# Patient Record
Sex: MALE | Race: WHITE | NOT HISPANIC OR LATINO | Employment: OTHER | URBAN - METROPOLITAN AREA
[De-identification: names, ages, dates, MRNs, and addresses within clinical notes are randomized per-mention and may not be internally consistent; named-entity substitution may affect disease eponyms.]

---

## 2018-05-25 ENCOUNTER — HOSPITAL ENCOUNTER (EMERGENCY)
Facility: HOSPITAL | Age: 78
Discharge: ANOTHER HOSPITAL | End: 2018-05-25
Attending: SURGERY
Payer: MEDICARE

## 2018-05-25 VITALS
TEMPERATURE: 97 F | DIASTOLIC BLOOD PRESSURE: 137 MMHG | RESPIRATION RATE: 23 BRPM | HEART RATE: 107 BPM | WEIGHT: 240 LBS | BODY MASS INDEX: 38.57 KG/M2 | OXYGEN SATURATION: 91 % | HEIGHT: 66 IN | SYSTOLIC BLOOD PRESSURE: 189 MMHG

## 2018-05-25 DIAGNOSIS — R07.9 CHEST PAIN: ICD-10-CM

## 2018-05-25 DIAGNOSIS — I21.3 ST ELEVATION MYOCARDIAL INFARCTION (STEMI), UNSPECIFIED ARTERY: Primary | ICD-10-CM

## 2018-05-25 PROBLEM — I21.02 STEMI INVOLVING LEFT ANTERIOR DESCENDING CORONARY ARTERY: Status: ACTIVE | Noted: 2018-05-25

## 2018-05-25 PROBLEM — I25.10 CORONARY ARTERY DISEASE INVOLVING NATIVE CORONARY ARTERY: Status: ACTIVE | Noted: 2018-05-25

## 2018-05-25 LAB
ALBUMIN SERPL BCP-MCNC: 3.7 G/DL
ALP SERPL-CCNC: 100 U/L
ALT SERPL W/O P-5'-P-CCNC: 16 U/L
ANION GAP SERPL CALC-SCNC: 13 MMOL/L
APTT BLDCRRT: 21.4 SEC
AST SERPL-CCNC: 17 U/L
BASOPHILS # BLD AUTO: 0.03 K/UL
BASOPHILS NFR BLD: 0.2 %
BILIRUB SERPL-MCNC: 0.7 MG/DL
BNP SERPL-MCNC: 121 PG/ML
BUN SERPL-MCNC: 17 MG/DL
CALCIUM SERPL-MCNC: 9.3 MG/DL
CHLORIDE SERPL-SCNC: 104 MMOL/L
CK MB SERPL-MCNC: 1 NG/ML
CK MB SERPL-RTO: 1.5 %
CK SERPL-CCNC: 68 U/L
CK SERPL-CCNC: 68 U/L
CO2 SERPL-SCNC: 23 MMOL/L
CREAT SERPL-MCNC: 1 MG/DL
D DIMER PPP IA.FEU-MCNC: 0.94 MG/L FEU
DIFFERENTIAL METHOD: ABNORMAL
EOSINOPHIL # BLD AUTO: 0.4 K/UL
EOSINOPHIL NFR BLD: 3 %
ERYTHROCYTE [DISTWIDTH] IN BLOOD BY AUTOMATED COUNT: 13.4 %
EST. GFR  (AFRICAN AMERICAN): >60 ML/MIN/1.73 M^2
EST. GFR  (NON AFRICAN AMERICAN): >60 ML/MIN/1.73 M^2
GLUCOSE SERPL-MCNC: 161 MG/DL
HCT VFR BLD AUTO: 51.9 %
HGB BLD-MCNC: 17.3 G/DL
INR PPP: 1.1
LYMPHOCYTES # BLD AUTO: 5.1 K/UL
LYMPHOCYTES NFR BLD: 42.1 %
MCH RBC QN AUTO: 29.5 PG
MCHC RBC AUTO-ENTMCNC: 33.3 G/DL
MCV RBC AUTO: 89 FL
MONOCYTES # BLD AUTO: 1.2 K/UL
MONOCYTES NFR BLD: 9.7 %
NEUTROPHILS # BLD AUTO: 5.5 K/UL
NEUTROPHILS NFR BLD: 45 %
PLATELET # BLD AUTO: 260 K/UL
PMV BLD AUTO: 10.7 FL
POTASSIUM SERPL-SCNC: 3.8 MMOL/L
PROT SERPL-MCNC: 7.8 G/DL
PROTHROMBIN TIME: 10.8 SEC
RBC # BLD AUTO: 5.86 M/UL
SODIUM SERPL-SCNC: 140 MMOL/L
TROPONIN I SERPL DL<=0.01 NG/ML-MCNC: 0.01 NG/ML
WBC # BLD AUTO: 12.16 K/UL

## 2018-05-25 PROCEDURE — 84484 ASSAY OF TROPONIN QUANT: CPT

## 2018-05-25 PROCEDURE — 80053 COMPREHEN METABOLIC PANEL: CPT

## 2018-05-25 PROCEDURE — 99291 CRITICAL CARE FIRST HOUR: CPT | Mod: 25

## 2018-05-25 PROCEDURE — 96365 THER/PROPH/DIAG IV INF INIT: CPT

## 2018-05-25 PROCEDURE — 63600175 PHARM REV CODE 636 W HCPCS

## 2018-05-25 PROCEDURE — 96374 THER/PROPH/DIAG INJ IV PUSH: CPT | Mod: 59

## 2018-05-25 PROCEDURE — 85379 FIBRIN DEGRADATION QUANT: CPT

## 2018-05-25 PROCEDURE — 96375 TX/PRO/DX INJ NEW DRUG ADDON: CPT | Mod: 59

## 2018-05-25 PROCEDURE — 85610 PROTHROMBIN TIME: CPT

## 2018-05-25 PROCEDURE — 93005 ELECTROCARDIOGRAM TRACING: CPT

## 2018-05-25 PROCEDURE — 85025 COMPLETE CBC W/AUTO DIFF WBC: CPT

## 2018-05-25 PROCEDURE — 85730 THROMBOPLASTIN TIME PARTIAL: CPT

## 2018-05-25 PROCEDURE — 83880 ASSAY OF NATRIURETIC PEPTIDE: CPT

## 2018-05-25 PROCEDURE — 82550 ASSAY OF CK (CPK): CPT

## 2018-05-25 PROCEDURE — 63600175 PHARM REV CODE 636 W HCPCS: Performed by: SURGERY

## 2018-05-25 PROCEDURE — 96368 THER/DIAG CONCURRENT INF: CPT

## 2018-05-25 PROCEDURE — 82553 CREATINE MB FRACTION: CPT

## 2018-05-25 PROCEDURE — 36415 COLL VENOUS BLD VENIPUNCTURE: CPT

## 2018-05-25 PROCEDURE — 25000003 PHARM REV CODE 250: Performed by: SURGERY

## 2018-05-25 RX ORDER — METOPROLOL TARTRATE 1 MG/ML
5 INJECTION, SOLUTION INTRAVENOUS
Status: COMPLETED | OUTPATIENT
Start: 2018-05-25 | End: 2018-05-25

## 2018-05-25 RX ORDER — NITROGLYCERIN 20 MG/100ML
INJECTION INTRAVENOUS
Status: DISCONTINUED
Start: 2018-05-25 | End: 2018-05-25 | Stop reason: HOSPADM

## 2018-05-25 RX ORDER — ONDANSETRON 2 MG/ML
4 INJECTION INTRAMUSCULAR; INTRAVENOUS
Status: COMPLETED | OUTPATIENT
Start: 2018-05-25 | End: 2018-05-25

## 2018-05-25 RX ORDER — NITROGLYCERIN 20 MG/100ML
10 INJECTION INTRAVENOUS CONTINUOUS
Status: DISCONTINUED | OUTPATIENT
Start: 2018-05-25 | End: 2018-05-25 | Stop reason: HOSPADM

## 2018-05-25 RX ORDER — METOPROLOL SUCCINATE 50 MG/1
50 TABLET, EXTENDED RELEASE ORAL DAILY
COMMUNITY
End: 2020-10-08 | Stop reason: DRUGHIGH

## 2018-05-25 RX ORDER — HEPARIN SODIUM 10000 [USP'U]/100ML
INJECTION, SOLUTION INTRAVENOUS
Status: COMPLETED
Start: 2018-05-25 | End: 2018-05-25

## 2018-05-25 RX ORDER — HEPARIN SODIUM,PORCINE/D5W 25000/250
17 INTRAVENOUS SOLUTION INTRAVENOUS CONTINUOUS
Status: DISCONTINUED | OUTPATIENT
Start: 2018-05-25 | End: 2018-05-25 | Stop reason: HOSPADM

## 2018-05-25 RX ORDER — LISINOPRIL 40 MG/1
40 TABLET ORAL 2 TIMES DAILY
COMMUNITY
End: 2020-10-08

## 2018-05-25 RX ORDER — FUROSEMIDE 10 MG/ML
INJECTION INTRAMUSCULAR; INTRAVENOUS
Status: DISCONTINUED
Start: 2018-05-25 | End: 2018-05-25 | Stop reason: HOSPADM

## 2018-05-25 RX ORDER — CLONIDINE HYDROCHLORIDE 0.2 MG/1
0.2 TABLET ORAL 2 TIMES DAILY
COMMUNITY
End: 2020-10-08

## 2018-05-25 RX ORDER — ASPIRIN 81 MG/1
81 TABLET ORAL DAILY
COMMUNITY
End: 2020-10-08

## 2018-05-25 RX ORDER — MORPHINE SULFATE 4 MG/ML
2 INJECTION, SOLUTION INTRAMUSCULAR; INTRAVENOUS
Status: COMPLETED | OUTPATIENT
Start: 2018-05-25 | End: 2018-05-25

## 2018-05-25 RX ORDER — FUROSEMIDE 10 MG/ML
40 INJECTION INTRAMUSCULAR; INTRAVENOUS
Status: COMPLETED | OUTPATIENT
Start: 2018-05-25 | End: 2018-05-25

## 2018-05-25 RX ORDER — NAPROXEN SODIUM 220 MG/1
81 TABLET, FILM COATED ORAL
Status: COMPLETED | OUTPATIENT
Start: 2018-05-25 | End: 2018-05-25

## 2018-05-25 RX ADMIN — FUROSEMIDE 40 MG: 10 INJECTION, SOLUTION INTRAMUSCULAR; INTRAVENOUS at 02:05

## 2018-05-25 RX ADMIN — NITROGLYCERIN 10 MCG/MIN: 20 INJECTION INTRAVENOUS at 02:05

## 2018-05-25 RX ADMIN — MORPHINE SULFATE 2 MG: 4 INJECTION INTRAVENOUS at 02:05

## 2018-05-25 RX ADMIN — METOPROLOL TARTRATE 5 MG: 5 INJECTION, SOLUTION INTRAVENOUS at 02:05

## 2018-05-25 RX ADMIN — Medication 17 UNITS/KG/HR: at 02:05

## 2018-05-25 RX ADMIN — ONDANSETRON 4 MG: 2 SOLUTION INTRAMUSCULAR; INTRAVENOUS at 02:05

## 2018-05-25 RX ADMIN — HEPARIN SODIUM AND DEXTROSE 17 UNITS/KG/HR: 10000; 5 INJECTION INTRAVENOUS at 02:05

## 2018-05-25 RX ADMIN — ASPIRIN 81 MG 81 MG: 81 TABLET ORAL at 02:05

## 2018-05-25 NOTE — ED PROVIDER NOTES
"[facility]                                                 Chief Complaint  77 y.o. male with Chest Pain (for one hour with SOB, constant "pressure")    History of Present Illness  Alonso Ortiz presents to the emergency room with chest pain this evening  Pt's chest pain started approximately 1 hour ago, obvious distress on arrival  EKG showed an ST elevation MI most leads, patient sees [facility] ER immediately called for stat transfer, pt had been here 25 min  Patient has hypertensive, diaphoretic, heparin and nitroglycerin and initiated    The history is provided by the patient   device was not used during this ER visit  Medical history: Coronary artery disease with an LAD stent  History reviewed. No pertinent surgical history.   No Known Allergies   No family history on file.    Review of Systems and Physical Exam      Review of Systems - provided by the patient  -- Constitution - no fever, denies fatigue, no weakness, no chills  -- Eyes - no tearing or redness, no visual disturbance  -- Ear, Nose - no tinnitus or earache, no nasal congestion or discharge  -- Mouth,Throat - no sore throat, no toothache, normal voice, normal swallowing  -- Respiratory - shortness of breath, no cough or congestion of breath, no HARRIS  -- Cardiovascular - chest pain, no palpitations, denies claudication  -- Gastrointestinal - denies abdominal pain, nausea, vomiting, or diarrhea  -- Genitourinary - no dysuria, no denies flank pain, no hematuria or frequency   -- Musculoskeletal - denies back pain, negative for myalgias and arthralgias   -- Neurological - no headache, denies weakness or seizure; no LOC  -- Skin - denies pallor, rash, or changes in skin. no hives or welts noted    BP (!) 189/135   Pulse 104   Temp 97.3 °F  Resp (!) 26  SpO2 (!) 82%    Physical Exam  -- Nursing note and vitals reviewed  -- Constitutional: Diaphoretic older white male  -- Head: Atraumatic. " Normocephalic. No obvious abnormality  -- Eyes: Pupils are equal and reactive to light. Normal conjunctiva and lids  -- Cardiac: Normal rate, regular rhythm and normal heart sounds  -- Pulmonary: Normal respiratory effort, breath sounds clear to auscultation  -- Abdominal: Soft, no tenderness. Normal bowel sounds. Normal liver edge  -- Musculoskeletal: Normal range of motion, no effusions. Joints stable   -- Neurological: No focal deficits. Showed good interaction with staff  -- Vascular: Posterior tibial, dorsalis pedis and radial pulses 2+ bilaterally      Emergency Room Course      Treatment and Evaluation  -- The CBC drawn in the ER today was within normal limits  -- The rest of the ordered lab work is pending at the time of transfer    EKG  -- EKG shows ST elevation MI    Radiology  -- Chest x-ray shows pulmonary edema    Medications Given  -- nitroGLYCERIN 50 mg in dextrose 5 % 250 mL infusion (15 mcg/min Intravenous Rate/Dose Change 5/25/18 0217)   -- heparin 25,000 units in dextrose 5% 250 mL (100 units/mL) bolus from bag; PRN BOLUS (not administered)   -- heparin 25,000 units in dextrose 5% 250 mL (100 units/mL) bolus from bag; PRN BOLUS (not administered)   -- heparin 25,000 units in dextrose 5% 250 mL (100 units/mL) infusion; MALE   -- furosemide injection 40 mg (not administered)   -- furosemide (LASIX) 10 mg/mL injection (not administered)   -- aspirin chewable tablet 81 mg (81 mg Oral Given 5/25/18 0211)   -- metoprolol injection 5 mg (5 mg Intravenous Given 5/25/18 0207)   -- heparin 25,000 units in dextrose 5% 250 mL (100 units/mL) bolus from bag; INITIAL BOLUS DOSE   -- morphine injection 2 mg (2 mg Intravenous Given 5/25/18 0213)   -- ondansetron injection 4 mg (4 mg Intravenous Given 5/25/18 0205)     Medical Decision Making  -- Diagnosis management comments: 77 y.o. male with a STEMI this evening  -- Nitroglycerin and heparin drip immediately started with an aspirin and morphine  -- [facility] immediately called, accepted to go straight to the cath lab    Critical Care ED Physician Time (minutes):  -- Performed by: Servando Earl M.D.  -- Date/Time: 2:28 AM 5/25/2018   -- Direct Patient Care (Face Time): 10  -- Additional History from Records or Taking Additional History: 10  -- Ordering, Reviewing, and Interpreting Diagnostic Studies: 10  -- Total Time in Documentation: 0  -- Consultation with Other Physicians: 10  -- Consultation with Family Related to Condition: 0  -- Total Critical Care Time: 40    Diagnosis  -- ST elevation myocardial infarction (STEMI)  -- Chest pain     Disposition and Plan  -- Disposition: transfer  -- Condition: stable  -- The patient needs a higher level of care  -- The patient is appropriate for transfer    This note is dictated on Dragon Natural Speaking word recognition program.  There are word recognition mistakes that are occasionally missed on review.              Servando Earl MD  05/25/18 0229

## 2018-05-25 NOTE — ED NOTES
Patient transferred out on stretcher with [facility] with nitroglycerin gtt @ 15 mcg/min & Heparin gtt @ 17 units/kg/hour per orders.  Patient placed on CPAP by EMT-P per Dr. Earl's orders.  Report given to [facility] supervisor and EMT-P.

## 2018-05-28 PROBLEM — Z78.9 KNOWLEDGE DEFICIT ABOUT THERAPEUTIC DIET: Status: ACTIVE | Noted: 2018-05-28

## 2018-06-04 PROBLEM — Z78.9 KNOWLEDGE DEFICIT ABOUT THERAPEUTIC DIET: Status: ACTIVE | Noted: 2018-06-04

## 2018-06-06 ENCOUNTER — HOSPITAL ENCOUNTER (EMERGENCY)
Facility: HOSPITAL | Age: 78
Discharge: ANOTHER HOSPITAL | End: 2018-06-07
Attending: SURGERY
Payer: MEDICARE

## 2018-06-06 DIAGNOSIS — I50.9 CONGESTIVE HEART FAILURE, UNSPECIFIED HF CHRONICITY, UNSPECIFIED HEART FAILURE TYPE: Primary | ICD-10-CM

## 2018-06-06 DIAGNOSIS — R06.02 SOB (SHORTNESS OF BREATH): ICD-10-CM

## 2018-06-06 LAB
ALBUMIN SERPL BCP-MCNC: 3.1 G/DL
ALP SERPL-CCNC: 92 U/L
ALT SERPL W/O P-5'-P-CCNC: 26 U/L
ANION GAP SERPL CALC-SCNC: 8 MMOL/L
APTT BLDCRRT: 25.6 SEC
AST SERPL-CCNC: 26 U/L
BASOPHILS # BLD AUTO: 0.05 K/UL
BASOPHILS NFR BLD: 0.5 %
BILIRUB SERPL-MCNC: 0.5 MG/DL
BNP SERPL-MCNC: 2418 PG/ML
BUN SERPL-MCNC: 12 MG/DL
CALCIUM SERPL-MCNC: 9.5 MG/DL
CHLORIDE SERPL-SCNC: 104 MMOL/L
CK MB SERPL-MCNC: 2.1 NG/ML
CK MB SERPL-RTO: 3.2 %
CK SERPL-CCNC: 66 U/L
CK SERPL-CCNC: 66 U/L
CO2 SERPL-SCNC: 26 MMOL/L
CREAT SERPL-MCNC: 0.9 MG/DL
DIFFERENTIAL METHOD: ABNORMAL
EOSINOPHIL # BLD AUTO: 0.2 K/UL
EOSINOPHIL NFR BLD: 1.9 %
ERYTHROCYTE [DISTWIDTH] IN BLOOD BY AUTOMATED COUNT: 13.1 %
EST. GFR  (AFRICAN AMERICAN): >60 ML/MIN/1.73 M^2
EST. GFR  (NON AFRICAN AMERICAN): >60 ML/MIN/1.73 M^2
GLUCOSE SERPL-MCNC: 114 MG/DL
HCT VFR BLD AUTO: 44.7 %
HGB BLD-MCNC: 15 G/DL
INR PPP: 1.2
LYMPHOCYTES # BLD AUTO: 2.3 K/UL
LYMPHOCYTES NFR BLD: 21.6 %
MCH RBC QN AUTO: 29.8 PG
MCHC RBC AUTO-ENTMCNC: 33.6 G/DL
MCV RBC AUTO: 89 FL
MONOCYTES # BLD AUTO: 1.1 K/UL
MONOCYTES NFR BLD: 10.2 %
NEUTROPHILS # BLD AUTO: 6.9 K/UL
NEUTROPHILS NFR BLD: 65.8 %
PLATELET # BLD AUTO: 328 K/UL
PMV BLD AUTO: 11.3 FL
POTASSIUM SERPL-SCNC: 4.2 MMOL/L
PROT SERPL-MCNC: 7.4 G/DL
PROTHROMBIN TIME: 11.9 SEC
RBC # BLD AUTO: 5.04 M/UL
SODIUM SERPL-SCNC: 138 MMOL/L
TROPONIN I SERPL DL<=0.01 NG/ML-MCNC: 0.9 NG/ML
WBC # BLD AUTO: 10.41 K/UL

## 2018-06-06 PROCEDURE — 94640 AIRWAY INHALATION TREATMENT: CPT

## 2018-06-06 PROCEDURE — 36415 COLL VENOUS BLD VENIPUNCTURE: CPT

## 2018-06-06 PROCEDURE — 84484 ASSAY OF TROPONIN QUANT: CPT

## 2018-06-06 PROCEDURE — 80053 COMPREHEN METABOLIC PANEL: CPT

## 2018-06-06 PROCEDURE — 82553 CREATINE MB FRACTION: CPT

## 2018-06-06 PROCEDURE — 83880 ASSAY OF NATRIURETIC PEPTIDE: CPT

## 2018-06-06 PROCEDURE — 63600175 PHARM REV CODE 636 W HCPCS: Performed by: SURGERY

## 2018-06-06 PROCEDURE — 93005 ELECTROCARDIOGRAM TRACING: CPT

## 2018-06-06 PROCEDURE — 25000242 PHARM REV CODE 250 ALT 637 W/ HCPCS: Performed by: SURGERY

## 2018-06-06 PROCEDURE — 99285 EMERGENCY DEPT VISIT HI MDM: CPT | Mod: 25

## 2018-06-06 PROCEDURE — 85025 COMPLETE CBC W/AUTO DIFF WBC: CPT

## 2018-06-06 PROCEDURE — 85610 PROTHROMBIN TIME: CPT

## 2018-06-06 PROCEDURE — 96374 THER/PROPH/DIAG INJ IV PUSH: CPT

## 2018-06-06 PROCEDURE — 82550 ASSAY OF CK (CPK): CPT

## 2018-06-06 PROCEDURE — 85730 THROMBOPLASTIN TIME PARTIAL: CPT

## 2018-06-06 PROCEDURE — 25000003 PHARM REV CODE 250: Performed by: SURGERY

## 2018-06-06 RX ORDER — NAPROXEN SODIUM 220 MG/1
81 TABLET, FILM COATED ORAL
Status: COMPLETED | OUTPATIENT
Start: 2018-06-06 | End: 2018-06-06

## 2018-06-06 RX ORDER — LEVALBUTEROL 1.25 MG/.5ML
1.25 SOLUTION, CONCENTRATE RESPIRATORY (INHALATION)
Status: COMPLETED | OUTPATIENT
Start: 2018-06-06 | End: 2018-06-06

## 2018-06-06 RX ORDER — FUROSEMIDE 10 MG/ML
40 INJECTION INTRAMUSCULAR; INTRAVENOUS
Status: COMPLETED | OUTPATIENT
Start: 2018-06-06 | End: 2018-06-06

## 2018-06-06 RX ADMIN — ASPIRIN 81 MG 81 MG: 81 TABLET ORAL at 10:06

## 2018-06-06 RX ADMIN — LEVALBUTEROL 1.25 MG: 1.25 SOLUTION, CONCENTRATE RESPIRATORY (INHALATION) at 10:06

## 2018-06-06 RX ADMIN — FUROSEMIDE 40 MG: 10 INJECTION, SOLUTION INTRAMUSCULAR; INTRAVENOUS at 11:06

## 2018-06-07 VITALS
TEMPERATURE: 97 F | RESPIRATION RATE: 29 BRPM | WEIGHT: 242.06 LBS | OXYGEN SATURATION: 98 % | HEIGHT: 66 IN | SYSTOLIC BLOOD PRESSURE: 128 MMHG | BODY MASS INDEX: 38.9 KG/M2 | HEART RATE: 108 BPM | DIASTOLIC BLOOD PRESSURE: 87 MMHG

## 2018-06-07 PROBLEM — R63.8 ALTERATION IN NUTRITION: Status: ACTIVE | Noted: 2018-06-07

## 2018-06-07 PROBLEM — I25.5 ISCHEMIC CARDIOMYOPATHY: Status: ACTIVE | Noted: 2018-06-07

## 2018-06-07 PROBLEM — I50.21 ACUTE SYSTOLIC HEART FAILURE: Status: ACTIVE | Noted: 2018-06-07

## 2018-06-07 PROBLEM — I50.9 ACUTE ON CHRONIC CONGESTIVE HEART FAILURE: Status: ACTIVE | Noted: 2018-06-07

## 2018-06-07 PROCEDURE — 93005 ELECTROCARDIOGRAM TRACING: CPT

## 2018-06-07 NOTE — ED PROVIDER NOTES
"[facility]                                                 Chief Complaint  77 y.o. male with Shortness of Breath (x few hours "can't catch my breath")    History of Present Illness  Alonso Ortiz presents to the emergency room with shortness of breath tonight  Pt has been short of breath the last couple hours, crackles in the bases bilaterally  Recently had a myocardial infarction with stents on May 25, 2018 at [place]  Patient has residual CHF with an ejection fraction of 15% 9 days ago post stent  Patient states he's been wearing his LifeVest that taking his medications routinely  Patient has a BNP of 2400, consistent with CHF post myocardial infarction/stents    The history is provided by the patient   device was not used during this ER visit  Medical history: Coronary artery disease and hypertension, myocardial infarction  Surgeries: Left heart catheterization  No Known Allergies   No family history on file.    Review of Systems and Physical Exam      Review of Systems - provided by the patient  -- Constitution - no fever, denies fatigue, no weakness, no chills  -- Eyes - no tearing or redness, no visual disturbance  -- Ear, Nose - no tinnitus or earache, no nasal congestion or discharge  -- Mouth,Throat - no sore throat, no toothache, normal voice, normal swallowing  -- Respiratory - shortness of breath, no HARRIS, no cough or cold symptoms  -- Cardiovascular - denies chest pain, no palpitations, denies claudication  -- Gastrointestinal - denies abdominal pain, nausea, vomiting, or diarrhea  -- Genitourinary - no dysuria, no denies flank pain, no hematuria or frequency   -- Musculoskeletal - denies back pain, negative for myalgias and arthralgias   -- Neurological - no headache, denies weakness or seizure; no LOC  -- Skin - denies pallor, rash, or changes in skin. no hives or welts noted    BP (!) 163/110   Pulse (!) 119   Temp 96.6 °F   Resp (!) 31 SpO2 " 98%    Physical Exam  -- Nursing note and vitals reviewed  -- Constitutional: Appears well-developed and well-nourished  -- Head: Atraumatic. Normocephalic. No obvious abnormality  -- Eyes: Pupils are equal and reactive to light. Normal conjunctiva and lids  -- Cardiac: Normal rate, regular rhythm and normal heart sounds  -- Pulmonary: Crackles at the bilateral bases with diminished air exchange  -- Abdominal: Soft, no tenderness. Normal bowel sounds. Normal liver edge  -- Musculoskeletal: Normal range of motion, no effusions. Joints stable   -- Neurological: No focal deficits. Showed good interaction with staff  -- Vascular: Posterior tibial, dorsalis pedis and radial pulses 2+ bilaterally      Emergency Room Course      Labs     K 4.2      CO2 26   BUN 12   CREATININE 0.9    (H)   ALKPHOS 92   AST 26   ALT 26   BILITOT 0.5   ALBUMIN 3.1 (L)   PROT 7.4   WBC 10.41   HGB 15.0   HCT 44.7      CPK 66   CPK 66   CPKMB 2.1   TROPONINI 0.902 (H)   INR 1.2   BNP 2,418 (H)   DDIMER 0.94 (H)   MG 2.3   TSH 2.02     EKG  -- The EKG findings: sinus tachycardia with left atrial enlargement    Radiology  -- Chest x-ray showed CHF findings tonight    Medications Given  -- aspirin chewable tablet 81 mg (81 mg Oral Given 6/6/18 2225)   -- levalbuterol nebulizer solution 1.25 mg (1.25 mg Nebulization Given 6/6/18 2232)   -- furosemide injection 40 mg (40 mg Intravenous Given 6/6/18 2312)     Medical Decision Making  -- Diagnosis management comments: 77 y.o. male with shortness of breath tonight  -- Myocardial infarction on the 25th with several heart stents, was intubated also  -- Patient has been out of the hospital approximately 7 days, very short of breath  -- Patient had a ejection fraction of 15% on the 25th, BNP is now 2400  -- IV Lasix given in the ER, patient sees Dr. Davenport at [place]  -- Patient to be transferred for full cardiac evaluation: CHF post MI with stents    Diagnosis  -- Congestive  heart failure  -- SOB (shortness of breath)     Disposition and Plan  -- Disposition: transfer  -- Condition: stable  -- The patient needs a higher level of care  -- The patient is appropriate for transfer    This note is dictated on Dragon Natural Speaking word recognition program.  There are word recognition mistakes that are occasionally missed on review.            Servando Earl MD  06/06/18 5625

## 2018-06-07 NOTE — ED TRIAGE NOTES
"77/w/m presents to ED c/o SOB and 'can't catch my breath'  Since this afternoon.  Denies CP at this time.  Patient is wearing life vest.  States "doesn't feel like I'm getting enough air."  Hx of recent STEMI.      Dr. Earl at bedside.  ECG done at bedside.    "

## 2018-06-08 PROBLEM — I50.9 CHF (CONGESTIVE HEART FAILURE): Status: ACTIVE | Noted: 2018-06-08

## 2018-09-26 ENCOUNTER — TELEPHONE (OUTPATIENT)
Dept: ADMINISTRATIVE | Facility: CLINIC | Age: 78
End: 2018-09-26